# Patient Record
Sex: MALE | Race: OTHER | HISPANIC OR LATINO
[De-identification: names, ages, dates, MRNs, and addresses within clinical notes are randomized per-mention and may not be internally consistent; named-entity substitution may affect disease eponyms.]

---

## 2024-01-11 PROBLEM — Z00.00 ENCOUNTER FOR PREVENTIVE HEALTH EXAMINATION: Status: ACTIVE | Noted: 2024-01-11

## 2024-01-16 ENCOUNTER — APPOINTMENT (OUTPATIENT)
Dept: RHEUMATOLOGY | Facility: CLINIC | Age: 34
End: 2024-01-16
Payer: COMMERCIAL

## 2024-01-16 VITALS
DIASTOLIC BLOOD PRESSURE: 71 MMHG | HEIGHT: 68 IN | OXYGEN SATURATION: 98 % | WEIGHT: 162 LBS | BODY MASS INDEX: 24.55 KG/M2 | SYSTOLIC BLOOD PRESSURE: 123 MMHG | HEART RATE: 65 BPM

## 2024-01-16 PROCEDURE — 99204 OFFICE O/P NEW MOD 45 MIN: CPT

## 2024-01-16 NOTE — ASSESSMENT
[FreeTextEntry1] : Borderline NADIRA and Ro: Pt had NADIRA 1:80 with Ro 1.6 on recent testing, unclear why these were checked, pt does not have typical symptoms of either Sjogren's syndrome or any other systemic connective tissue disease, and his rheum exam today is unremarkable. Low suspicion that his panic attack symptoms are related to his borderline labs; this is not a typical symptom of a systemic connective tissue disease. - No further rheum workup recommended at this point. Would not recommend trending NADIRA or Ro antibody if pt is not having concerning symptoms  f/u prn

## 2024-01-16 NOTE — HISTORY OF PRESENT ILLNESS
[FreeTextEntry1] : Pt has had a long-standing history of panic attacks, with several episodes recently in the setting of purchasing a home and taking out an expensive mortgage, one resulting in an ED visit. He does not treat the panic attacks currently, he tries to calm himself down, but he is considering seeing a behavioral health specialist. Pt had recent bloodwork which demonstrated NADIRA 1:80 and Ro 1.6. He denies any sicca symptoms. + Blurry vision recently, pt is not sure if it could be related to his anxiety. + Brain fog and head pressure. + L-sided pain x years, was told it is muscular. + Loose stool after pt eats. Pt denies dysphagia, joint pain, swelling anywhere, oral ulcers. He recently cut out drinking and smoking and is also trying to change to a healthier diet.  Physical exam: GEN: Pleasant, AAO man sitting on exam table in NAD SKIN: No rashes MOUTH: Moist mucous membranes, no oral ulcers ENT: Normal tympanic reflex b/l, no LAD PULM: Clear to auscultation b/l CV: Regular rate and rhythm, no murmurs MSK: Shoulders: Full ROM b/l Elbows: Full ROM b/l, no effusions Wrists: Full ROM b/l, no effusions Hands: no synovitis Hips: Full ROM b/l Knees: no effusions, full ROM b/l Ankles: no effusions, full ROM b/l Feet; no effusions, no TTP EXT: normal nailfold capillaries, no LE edema b/l

## 2024-08-01 ENCOUNTER — APPOINTMENT (OUTPATIENT)
Dept: CARDIOLOGY | Facility: CLINIC | Age: 34
End: 2024-08-01

## 2024-08-09 ENCOUNTER — APPOINTMENT (OUTPATIENT)
Dept: UROLOGY | Facility: CLINIC | Age: 34
End: 2024-08-09

## 2024-08-09 PROCEDURE — 81003 URINALYSIS AUTO W/O SCOPE: CPT | Mod: QW

## 2024-08-09 PROCEDURE — 99204 OFFICE O/P NEW MOD 45 MIN: CPT

## 2024-08-09 PROCEDURE — G2211 COMPLEX E/M VISIT ADD ON: CPT | Mod: NC

## 2024-08-09 PROCEDURE — 51798 US URINE CAPACITY MEASURE: CPT

## 2024-08-09 NOTE — PHYSICAL EXAM
[Normal Appearance] : normal appearance [Well Groomed] : well groomed [General Appearance - In No Acute Distress] : no acute distress [] : no respiratory distress [Respiration, Rhythm And Depth] : normal respiratory rhythm and effort [Exaggerated Use Of Accessory Muscles For Inspiration] : no accessory muscle use [Anus Abnormality] : the anus and perineum were normal [Rectal Exam - Rectum] : digital rectal exam was normal [Prostate Enlargement] : the prostate was not enlarged [Prostate Tenderness] : the prostate was not tender [No Prostate Nodules] : no prostate nodules [Normal Station and Gait] : the gait and station were normal for the patient's age [No Focal Deficits] : no focal deficits [Oriented To Time, Place, And Person] : oriented to person, place, and time [Affect] : the affect was normal [Mood] : the mood was normal

## 2024-08-09 NOTE — ASSESSMENT
[FreeTextEntry1] : 34-year-old male with LUTS, nocturia and family history of prostate cancer.  Ordered UA, urine culture Ordered bladder ultrasound to assess for pre and postvoid residuals as well as prostate volume.  Follow-up in 2 to 3 weeks.

## 2024-08-09 NOTE — HISTORY OF PRESENT ILLNESS
[FreeTextEntry1] : 33 yo male is a new patient here for LUTS.  He is accompanied by his wife.   Nocturia 5-6x nightly During the wake hours urinates every 3-4 hours Patient endorses weak stream and urinary stream intermittency. Started over one year ago, gradually gotten worse Double voids. Second void is a few drops Started taking [employer] prostate men's formula tabs 3 days ago. Not noticed any difference. The patient and his wife would like to make sure he does not have prostate cancer as there is a family history of prostate cancer in his family. Denies dysuria, gross hematuria  Family history of prostate ca in both grandfathers.   Quit smoking marijuana 2 weeks ago Quit smoking cigarettes 1-2 years Stopped drinking alcohol 7 months  Patient was unable to void a satisfactory amount for a good uroflow study today as he only voided 35 mL for the study. PVR today: 3 mL  Labs (07/31/2024): -Total PSA 1.0; percent free PSA 20% -Creatinine 0.99

## 2024-08-16 ENCOUNTER — RESULT REVIEW (OUTPATIENT)
Age: 34
End: 2024-08-16

## 2024-08-30 ENCOUNTER — APPOINTMENT (OUTPATIENT)
Dept: UROLOGY | Facility: CLINIC | Age: 34
End: 2024-08-30

## 2024-08-30 DIAGNOSIS — R39.9 UNSPECIFIED SYMPTOMS AND SIGNS INVOLVING THE GENITOURINARY SYSTEM: ICD-10-CM

## 2024-08-30 DIAGNOSIS — R35.1 NOCTURIA: ICD-10-CM

## 2024-08-30 PROCEDURE — 99213 OFFICE O/P EST LOW 20 MIN: CPT

## 2024-08-30 NOTE — HISTORY OF PRESENT ILLNESS
[FreeTextEntry1] : 33 yo male is following up for LUTS.  To review from his initial visit 08/09/2024: Nocturia 5-6x nightly During the wake hours urinates every 3-4 hours Patient endorses weak stream and urinary stream intermittency. Started over one year ago, gradually gotten worse Double voids. Second void is a few drops Started taking [employer] prostate men's formula tabs 3 days ago. Not noticed any difference. The patient and his wife would like to make sure he does not have prostate cancer as there is a family history of prostate cancer in his family. Denies dysuria, gross hematuria  Family history of prostate ca in both grandfathers.  Quit smoking marijuana 2 weeks ago Quit smoking cigarettes 1-2 years Stopped drinking alcohol 7 months  Patient was unable to void a satisfactory amount for a good uroflow study today as he only voided 35 mL for the study. PVR today: 3 mL  Labs (07/31/2024): -Total PSA 1.0; percent free PSA 20% -Creatinine 0.99    Today's visit 08/30/2024: Patient states he feels his urinary stream has improved since he self started the [employer] prostate men's supplement.  He also reports that his nocturia has improved to 2-3x after starting melatonin to help him sleep. Denies dysuria, gross hematuria  Reviewed labs and imaging and discussed results with patient. Labs 08/11/2024: -UA: Negative nitrite, negative leuk, negative blood -Urine culture: Normal  tamara  Bladder ultrasound 08/16/2024: -Bladder: No debris or stones. Bilateral ureteral jets are visualized. Urinary bladder prevoid volume measures 143.3 cc . Postvoid volume measures 40 cc. Urinary bladder wall measures 0.3 cm. -Prostate: Measures 22 cc.

## 2024-08-30 NOTE — ASSESSMENT
[FreeTextEntry1] : 34-year-old male with LUTS and nocturia.  Reviewed labs and imaging and discussed with patient as noted above. Bladder ultrasound shows low PVR. LUTS and nocturia symptoms improved. Patient has a family history of prostate cancer in both his grandfathers.  Recommended he start prostate cancer screening with PSAs at age 46.  Follow-up as needed.